# Patient Record
Sex: FEMALE | Race: WHITE | NOT HISPANIC OR LATINO | Employment: FULL TIME | URBAN - METROPOLITAN AREA
[De-identification: names, ages, dates, MRNs, and addresses within clinical notes are randomized per-mention and may not be internally consistent; named-entity substitution may affect disease eponyms.]

---

## 2022-07-28 ENCOUNTER — TELEPHONE (OUTPATIENT)
Dept: OBGYN CLINIC | Facility: CLINIC | Age: 62
End: 2022-07-28

## 2022-07-28 DIAGNOSIS — Z12.31 ENCOUNTER FOR SCREENING MAMMOGRAM FOR BREAST CANCER: Primary | ICD-10-CM

## 2022-07-28 NOTE — TELEPHONE ENCOUNTER
Tea Vivas has not been seen for a while and has a [facility] set up for 9/30 with Dr Rhianna Askew  She is behind on her mammogram and would like to get her script and mammogram done before her visit in September  She uses [facility]  Advised the pt I will fax the script over, and she will be able to access the order via [employer] as well  Give [facility] 24 hours to receive the fax prior to calling to set up an apt

## 2022-08-14 DIAGNOSIS — Z12.31 ENCOUNTER FOR SCREENING MAMMOGRAM FOR BREAST CANCER: ICD-10-CM

## 2022-09-30 ENCOUNTER — ANNUAL EXAM (OUTPATIENT)
Dept: OBGYN CLINIC | Facility: CLINIC | Age: 62
End: 2022-09-30

## 2022-09-30 VITALS
BODY MASS INDEX: 21.26 KG/M2 | SYSTOLIC BLOOD PRESSURE: 138 MMHG | HEIGHT: 63 IN | WEIGHT: 120 LBS | DIASTOLIC BLOOD PRESSURE: 64 MMHG

## 2022-09-30 DIAGNOSIS — Z12.31 ENCOUNTER FOR SCREENING MAMMOGRAM FOR MALIGNANT NEOPLASM OF BREAST: ICD-10-CM

## 2022-09-30 DIAGNOSIS — Z13.820 SCREENING FOR OSTEOPOROSIS: ICD-10-CM

## 2022-09-30 DIAGNOSIS — E28.39 ESTROGEN DEFICIENCY: ICD-10-CM

## 2022-09-30 DIAGNOSIS — Z01.419 ENCOUNTER FOR ANNUAL ROUTINE GYNECOLOGICAL EXAMINATION: Primary | ICD-10-CM

## 2022-09-30 DIAGNOSIS — Z12.4 CERVICAL CANCER SCREENING: ICD-10-CM

## 2022-09-30 RX ORDER — AMLODIPINE BESYLATE 5 MG/1
5 TABLET ORAL DAILY
COMMUNITY
Start: 2022-08-04

## 2022-09-30 RX ORDER — DEXTROMETHORPHAN POLISTIREX 30 MG/5 ML
SUSPENSION, EXTENDED RELEASE 12 HR ORAL
COMMUNITY

## 2022-09-30 NOTE — LETTER
2022     Ra Galindo [address]  [facility] [address]    Patient: Marcelo Washington   YOB: 1960   Date of Visit: 2022       Dear Dr Sekou Talbot: Thank you for referring Marcelo Washington to me for evaluation  Below are my notes for this consultation  If you have questions, please do not hesitate to call me  I look forward to following your patient along with you  Sincerely,        Leonora Jackson MD        CC: No Recipients  Leonora Jackson MD  2022  1:59 PM  Sign when Signing Visit  Annual Wellness Visit  [address]   [address] [facility], [address], [facility] [address]    ASSESSMENT/PLAN: Marcelo Washington is a 60 y o   who presents for annual gynecologic exam     Encounter for routine gynecologic examination  - Routine well woman exam completed today  - Cervical Cancer Screening: Current ASCCP Guidelines reviewed  Last Pap: 09/15/2017 normal  Next Pap Due: today, routine   - Contraceptive counseling discussed  Current contraception: vasectomy,   - Breast Cancer Screening: Last Mammogram 2022, normal  - Colorectal cancer screening last  per pt, next due   - The following were reviewed in today's visit: mammography screening ordered, osteoporosis, adequate intake of calcium and vitamin D, exercise, healthy diet and DEXA ordered    Additional problems addressed during this visit:  1  Encounter for annual routine gynecological examination    2  Cervical cancer screening  -     Thinprep Tis Pap and HPV mRNA E6/E7 Reflex HPV 16,18/45    3  Encounter for screening mammogram for malignant neoplasm of breast  -     Mammo screening bilateral w 3d & cad; Future    4  Screening for osteoporosis  Comments:  Encouraged to check dexa scan coverage with insurance  Orders:  -     DXA bone density spine hip and pelvis; Future    5  Estrogen deficiency  -     DXA bone density spine hip and pelvis;  Future        Next visit: 1 year Wellness      CC:  Annual Gynecologic Examination    HPI: Andrew Pina is a 60 y o   who presents for annual gynecologic examination  She denies any breast, urinary or pelvic issues at today's visit  Gyn History  No LMP recorded  Patient is postmenopausal      Last Pap: 09/15/2017 was normal    She  reports being sexually active and has had partner(s) who are male  She reports using the following method of birth control/protection: Post-menopausal        OB History      Past Medical History:  2022: History of screening mammography      Comment:  B-Rads 2  No date: Hypertension     Past Surgical History:  2016: BREAST BIOPSY; Left  2009: BREAST LUMPECTOMY  2012: [address]: SINUS SURGERY  1963: TONSILLECTOMY     Family History   Problem Relation Age of Onset    Coronary artery disease Mother     Hypertension Mother     Hyperlipidemia Mother     Diabetes Mother     Breast cancer Neg Hx     Colon cancer Neg Hx     Ovarian cancer Neg Hx         Social History     Tobacco Use    Smoking status: Never Smoker    Smokeless tobacco: Never Used   Vaping Use    Vaping Use: Never used   Substance Use Topics    Alcohol use: Yes     Comment: occasional    Drug use: Never          Current Outpatient Medications:     amLODIPine (NORVASC) 5 mg tablet, Take 5 mg by mouth daily, Disp: , Rfl:     Calcium-Vitamin D-Vitamin K (Calcium + D) 500-1000-40 MG-UNT-MCG CHEW, as directed, Disp: , Rfl:     Multiple Vitamins-Minerals (PRESERVISION AREDS 2+MULTI VIT PO), Every 12 hours, Disp: , Rfl:     She has No Known Allergies       ROS negative except as noted in HPI    Objective:  /64   Ht 5' 3" (1 6 m)   Wt 54 4 kg (120 lb)   Breastfeeding No   BMI 21 26 kg/m²      Physical Exam  Constitutional:       Appearance: Normal appearance  Chest:   Breasts:      Right: Normal  No mass, tenderness or axillary adenopathy  Left: Normal  No mass, tenderness or axillary adenopathy  Abdominal:      Palpations: Abdomen is soft  Tenderness: There is no abdominal tenderness  Genitourinary:     General: Normal vulva  Vagina: No bleeding or lesions  Cervix: Normal       Uterus: Normal  Not tender  Adnexa:         Right: No mass or tenderness  Left: No mass or tenderness  Rectum: No mass or external hemorrhoid  Normal anal tone  Comments: Atrophic changes appropriate to age  Musculoskeletal:         General: Normal range of motion  Lymphadenopathy:      Upper Body:      Right upper body: No axillary adenopathy  Left upper body: No axillary adenopathy  Neurological:      Mental Status: She is alert and oriented to person, place, and time     Psychiatric:         Mood and Affect: Mood normal          Behavior: Behavior normal

## 2022-09-30 NOTE — PROGRESS NOTES
Annual Wellness Visit  [address]   [address], [address], [facility]    ASSESSMENT/PLAN: Dot Prabhakar is a 60 y o   who presents for annual gynecologic exam     Encounter for routine gynecologic examination  - Routine well woman exam completed today  - Cervical Cancer Screening: Current ASCCP Guidelines reviewed  Last Pap: 09/15/2017 normal  Next Pap Due: today, routine   - Contraceptive counseling discussed  Current contraception: vasectomy,   - Breast Cancer Screening: Last Mammogram 2022, normal  - Colorectal cancer screening last  per pt, next due   - The following were reviewed in today's visit: mammography screening ordered, osteoporosis, adequate intake of calcium and vitamin D, exercise, healthy diet and DEXA ordered    Additional problems addressed during this visit:  1  Encounter for annual routine gynecological examination    2  Cervical cancer screening  -     Thinprep Tis Pap and HPV mRNA E6/E7 Reflex HPV 16,18/45    3  Encounter for screening mammogram for malignant neoplasm of breast  -     Mammo screening bilateral w 3d & cad; Future    4  Screening for osteoporosis  Comments:  Encouraged to check dexa scan coverage with insurance  Orders:  -     DXA bone density spine hip and pelvis; Future    5  Estrogen deficiency  -     DXA bone density spine hip and pelvis; Future        Next visit: 1 year Wellness      CC:  Annual Gynecologic Examination    HPI: Dot Prabhakar is a 60 y o   who presents for annual gynecologic examination  She denies any breast, urinary or pelvic issues at today's visit  Gyn History  No LMP recorded  Patient is postmenopausal      Last Pap: 09/15/2017 was normal    She  reports being sexually active and has had partner(s) who are male  She reports using the following method of birth control/protection: Post-menopausal        OB History      Past Medical History:  2022:  History of screening mammography      Comment:  B-Rads 2  No date: Hypertension     Past Surgical History:  2016: BREAST BIOPSY; Left  2009: BREAST LUMPECTOMY  2012: [address]: SINUS SURGERY  1963: TONSILLECTOMY     Family History   Problem Relation Age of Onset    Coronary artery disease Mother     Hypertension Mother     Hyperlipidemia Mother     Diabetes Mother     Breast cancer Neg Hx     Colon cancer Neg Hx     Ovarian cancer Neg Hx         Social History     Tobacco Use    Smoking status: Never Smoker    Smokeless tobacco: Never Used   Vaping Use    Vaping Use: Never used   Substance Use Topics    Alcohol use: Yes     Comment: occasional    Drug use: Never          Current Outpatient Medications:     amLODIPine (NORVASC) 5 mg tablet, Take 5 mg by mouth daily, Disp: , Rfl:     Calcium-Vitamin D-Vitamin K (Calcium + D) 500-1000-40 MG-UNT-MCG CHEW, as directed, Disp: , Rfl:     Multiple Vitamins-Minerals (PRESERVISION AREDS 2+MULTI VIT PO), Every 12 hours, Disp: , Rfl:     She has No Known Allergies       ROS negative except as noted in HPI    Objective:  /64   Ht 5' 3" (1 6 m)   Wt 54 4 kg (120 lb)   Breastfeeding No   BMI 21 26 kg/m²      Physical Exam  Constitutional:       Appearance: Normal appearance  Chest:   Breasts:      Right: Normal  No mass, tenderness or axillary adenopathy  Left: Normal  No mass, tenderness or axillary adenopathy  Abdominal:      Palpations: Abdomen is soft  Tenderness: There is no abdominal tenderness  Genitourinary:     General: Normal vulva  Vagina: No bleeding or lesions  Cervix: Normal       Uterus: Normal  Not tender  Adnexa:         Right: No mass or tenderness  Left: No mass or tenderness  Rectum: No mass or external hemorrhoid  Normal anal tone  Comments: Atrophic changes appropriate to age  Musculoskeletal:         General: Normal range of motion     Lymphadenopathy:      Upper Body:      Right upper body: No axillary adenopathy  Left upper body: No axillary adenopathy  Neurological:      Mental Status: She is alert and oriented to person, place, and time     Psychiatric:         Mood and Affect: Mood normal          Behavior: Behavior normal

## 2022-10-04 LAB
CLINICAL INFO: NORMAL
CYTOLOGY CMNT CVX/VAG CYTO-IMP: NORMAL
DATE PREVIOUS BX: NORMAL
HPV E6+E7 MRNA CVX QL NAA+PROBE: NOT DETECTED
LMP START DATE: NORMAL
SL AMB PREV. PAP:: NORMAL
SPECIMEN SOURCE CVX/VAG CYTO: NORMAL
STAT OF ADQ CVX/VAG CYTO-IMP: NORMAL

## 2023-09-25 ENCOUNTER — PREP FOR PROCEDURE (OUTPATIENT)
Age: 63
End: 2023-09-25

## 2023-09-25 ENCOUNTER — TELEPHONE (OUTPATIENT)
Age: 63
End: 2023-09-25

## 2023-09-25 DIAGNOSIS — Z12.11 SCREENING FOR COLON CANCER: Primary | ICD-10-CM

## 2023-09-25 NOTE — TELEPHONE ENCOUNTER
Scheduled date of colonoscopy (as of today): 11/13/2023   Physician performing colonoscopy: DR CORDON [facility]   Location of colonoscopy: [address] [facility]  Bowel prep reviewed with patient: MIRALAX/DULCOLAX  Instructions reviewed with patient by: [facility] via telephone. Procedure directions sent via [employer].   Clearances:  n/a

## 2023-09-25 NOTE — TELEPHONE ENCOUNTER
09/25/23  Screened by: Annette Montiel    Referring Provider Dr. Tremaine Todd    Pre- Screening: Colon cancer screening    There is no height or weight on file to calculate BMI.21.26  Has patient been referred for a routine screening Colonoscopy? yes  Is the patient between 43-73 years old? yes      Previous Colonoscopy yes   If yes:    Date: 10 years ago    Facility: [facility]    Reason:       SCHEDULING STAFF: If the patient is between 45yrs-49yrs, please advise patient to confirm benefits/coverage with their insurance company for a routine screening colonoscopy, some insurance carriers will only cover at [facility] [address], [facility] or older. If the patient is over 66years old, please schedule an office visit. Does the patient want to see a Gastroenterologist prior to their procedure OR are they having any GI symptoms? no    Has the patient been hospitalized or had abdominal surgery in the past 6 months? no    Does the patient use supplemental oxygen? no    Does the patient take Coumadin, Lovenox, Plavix, Elliquis, Xarelto, or other blood thinning medication? no    Has the patient had a stroke, cardiac event, or stent placed in the past year? no    SCHEDULING STAFF: If patient answers NO to above questions, then schedule procedure. If patient answers YES to above questions, then schedule office appointment. If patient is between 45yrs - 49yrs, please advise patient that we will have to confirm benefits & coverage with their insurance company for a routine screening colonoscopy.     PT PASSED OA    PT WILL CALL BACK TO SCHEDULE DUE TO NOT HAVING A

## 2023-10-30 ENCOUNTER — ANESTHESIA EVENT (OUTPATIENT)
Dept: ANESTHESIOLOGY | Facility: AMBULATORY SURGERY CENTER | Age: 63
End: 2023-10-30

## 2023-10-30 ENCOUNTER — ANESTHESIA (OUTPATIENT)
Dept: ANESTHESIOLOGY | Facility: AMBULATORY SURGERY CENTER | Age: 63
End: 2023-10-30

## 2023-11-02 ENCOUNTER — TELEPHONE (OUTPATIENT)
Dept: GASTROENTEROLOGY | Facility: CLINIC | Age: 63
End: 2023-11-02

## 2023-11-02 NOTE — TELEPHONE ENCOUNTER
Procedure confirmed  Colonoscopy     Via: [employer]    Instructions given: [employer]     Prep Given: Miralax/Dulcolax    Call the office if there are any questions.

## 2023-11-10 ENCOUNTER — ANNUAL EXAM (OUTPATIENT)
Dept: OBGYN CLINIC | Facility: CLINIC | Age: 63
End: 2023-11-10
Payer: COMMERCIAL

## 2023-11-10 VITALS
BODY MASS INDEX: 21.83 KG/M2 | DIASTOLIC BLOOD PRESSURE: 60 MMHG | HEIGHT: 63 IN | SYSTOLIC BLOOD PRESSURE: 138 MMHG | WEIGHT: 123.2 LBS

## 2023-11-10 DIAGNOSIS — Z12.31 BREAST CANCER SCREENING BY MAMMOGRAM: ICD-10-CM

## 2023-11-10 DIAGNOSIS — Z13.820 SCREENING FOR OSTEOPOROSIS: ICD-10-CM

## 2023-11-10 DIAGNOSIS — Z01.419 ENCOUNTER FOR ANNUAL ROUTINE GYNECOLOGICAL EXAMINATION: Primary | ICD-10-CM

## 2023-11-10 DIAGNOSIS — E28.39 ESTROGEN DEFICIENCY: ICD-10-CM

## 2023-11-10 DIAGNOSIS — Z98.890 HISTORY OF LEFT BREAST BIOPSY: ICD-10-CM

## 2023-11-10 PROCEDURE — 99396 PREV VISIT EST AGE 40-64: CPT | Performed by: OBSTETRICS & GYNECOLOGY

## 2023-11-10 NOTE — LETTER
LOV 12/13/18.  NOV 3/13/19.  Folic acid refilled per protocol   November 10, 7582     [facility] [address] [facility] [place] [facility]    Patient: Nikolas Bro   YOB: 1960   Date of Visit: 11/10/2023       Dear Dr. Suero Case: Thank you for referring Nikolas Bro to me for evaluation. Below are my notes for this consultation. If you have questions, please do not hesitate to call me. I look forward to following your patient along with you. Sincerely,        Murali Gregory MD        CC: No Recipients    Murali Gregory MD  11/10/2023  1:45 PM  Sign when Signing Visit  Annual Wellness Visit  [address]  [address] [facility], [address], [facility] [place], [address]     ASSESSMENT/PLAN: Nikolas Bro is a 58 y.o.  who presents for annual gynecologic exam.    Encounter for routine gynecologic examination  - Routine well woman exam completed today. - Cervical Cancer Screening: Current ASCCP Guidelines reviewed. Last Pap: 2022 normal. Next Pap Due: 2 years, routine.  - Contraceptive counseling discussed. Current contraception: postmenopausal  - Breast Cancer Screening: Last Mammogram 10/2023 per pt normal  - Colorectal cancer screening last , next due - scheduled Monday. - The following were reviewed in today's visit: mammography screening ordered, osteoporosis, adequate intake of calcium and vitamin D, exercise, and healthy diet    Additional problems addressed during this visit:  1. Encounter for annual routine gynecological examination    2. Breast cancer screening by mammogram  -     Mammo screening bilateral w 3d & cad; Future    3. History of left breast biopsy  -     Mammo screening bilateral w 3d & cad; Future    4. Screening for osteoporosis  Comments:  Encouraged to check insurance coverage prior to study  Orders:  -     DXA bone density spine hip and pelvis; Future    5. Estrogen deficiency  -     DXA bone density spine hip and pelvis;  Future        Next visit: 1 year Wellness      CC:  Annual Gynecologic Examination    HPI: Fatemeh Rodriguez is a 58 y.o.  who presents for annual gynecologic examination. She denies any breast, urinary or pelvic issues at today's visit. Still sexually active. No issues. Gyn History  No LMP recorded. Patient is postmenopausal.     Last Pap: 2022 was normal    She  reports being sexually active and has had partner(s) who are male. She reports using the following method of birth control/protection: Post-menopausal.       OB History      Past Medical History:  2022: History of screening mammography      Comment:  B-Rads 2. No date: Hypertension  No date: Varicella     Past Surgical History:  2016: BREAST BIOPSY; Left  2009: BREAST LUMPECTOMY  2012: [address]: SINUS SURGERY  1963: TONSILLECTOMY     Family History   Problem Relation Age of Onset   • Coronary artery disease Mother    • Hypertension Mother    • Hyperlipidemia Mother    • Diabetes Mother    • Lung cancer Father    • Acute myelogenous leukemia Sister    • No Known Problems Sister    • Drug abuse Brother    • No Known Problems Brother    • No Known Problems Daughter    • No Known Problems Son    • No Known Problems Paternal Grandfather    • Breast cancer Neg Hx    • Colon cancer Neg Hx    • Ovarian cancer Neg Hx         Social History     Tobacco Use   • Smoking status: Never   • Smokeless tobacco: Never   Vaping Use   • Vaping Use: Never used   Substance Use Topics   • Alcohol use: Yes     Alcohol/week: 2.0 standard drinks of alcohol     Types: 2 Glasses of wine per week     Comment: occasional   • Drug use: Never          Current Outpatient Medications:   •  amLODIPine (NORVASC) 5 mg tablet, Take 5 mg by mouth daily, Disp: , Rfl:   •  Calcium-Vitamin D-Vitamin K (Calcium + D) 500-1000-40 MG-UNT-MCG CHEW, as directed, Disp: , Rfl:   •  Multiple Vitamins-Minerals (PRESERVISION AREDS 2+MULTI VIT PO), Every 12 hours, Disp: , Rfl:     She has No Known Allergies. .    ROS negative except as noted in HPI    Objective:  /60 (BP Location: Left arm, Patient Position: Sitting, Cuff Size: Standard)   Ht 5' 3" (1.6 m)   Wt 55.9 kg (123 lb 3.2 oz)   BMI 21.82 kg/m²      Physical Exam  Constitutional:       Appearance: Normal appearance. Chest:   Breasts:     Right: Normal. No mass or tenderness. Left: Normal. No mass or tenderness. Abdominal:      Palpations: Abdomen is soft. Tenderness: There is no abdominal tenderness. Genitourinary:     General: Normal vulva. Vagina: No bleeding or lesions. Cervix: Normal.      Uterus: Normal. Not tender. Adnexa:         Right: No mass or tenderness. Left: No mass or tenderness. Rectum: No mass or external hemorrhoid. Normal anal tone. Comments: Atrophic changes appropriate to age  Musculoskeletal:         General: Normal range of motion. Lymphadenopathy:      Upper Body:      Right upper body: No axillary adenopathy. Left upper body: No axillary adenopathy. Neurological:      Mental Status: She is alert and oriented to person, place, and time.    Psychiatric:         Mood and Affect: Mood normal.         Behavior: Behavior normal.

## 2023-11-10 NOTE — PROGRESS NOTES
Annual Wellness Visit  [address]  [address] [facility], [address], [place], [address] [facility]    ASSESSMENT/PLAN: Deloris Sauceda is a 58 y.o.  who presents for annual gynecologic exam.    Encounter for routine gynecologic examination  - Routine well woman exam completed today. - Cervical Cancer Screening: Current ASCCP Guidelines reviewed. Last Pap: 2022 normal. Next Pap Due: 2 years, routine.  - Contraceptive counseling discussed. Current contraception: postmenopausal  - Breast Cancer Screening: Last Mammogram 10/2023 per pt normal  - Colorectal cancer screening last , next due - scheduled Monday. - The following were reviewed in today's visit: mammography screening ordered, osteoporosis, adequate intake of calcium and vitamin D, exercise, and healthy diet    Additional problems addressed during this visit:  1. Encounter for annual routine gynecological examination    2. Breast cancer screening by mammogram  -     Mammo screening bilateral w 3d & cad; Future    3. History of left breast biopsy  -     Mammo screening bilateral w 3d & cad; Future    4. Screening for osteoporosis  Comments:  Encouraged to check insurance coverage prior to study  Orders:  -     DXA bone density spine hip and pelvis; Future    5. Estrogen deficiency  -     DXA bone density spine hip and pelvis; Future        Next visit: 1 year Wellness      CC:  Annual Gynecologic Examination    HPI: Deloris Sauceda is a 58 y.o.  who presents for annual gynecologic examination. She denies any breast, urinary or pelvic issues at today's visit. Still sexually active. No issues. Gyn History  No LMP recorded. Patient is postmenopausal.     Last Pap: 2022 was normal    She  reports being sexually active and has had partner(s) who are male. She reports using the following method of birth control/protection: Post-menopausal.       OB History      Past Medical History:  2022:  History of screening mammography      Comment:  B-Rads 2. No date: Hypertension  No date: Varicella     Past Surgical History:  2016: BREAST BIOPSY; Left  2009: BREAST LUMPECTOMY  2012: [address]: SINUS SURGERY  1963: TONSILLECTOMY     Family History   Problem Relation Age of Onset    Coronary artery disease Mother     Hypertension Mother     Hyperlipidemia Mother     Diabetes Mother     Lung cancer Father     Acute myelogenous leukemia Sister     No Known Problems Sister     Drug abuse Brother     No Known Problems Brother     No Known Problems Daughter     No Known Problems Son     No Known Problems Paternal Grandfather     Breast cancer Neg Hx     Colon cancer Neg Hx     Ovarian cancer Neg Hx         Social History     Tobacco Use    Smoking status: Never    Smokeless tobacco: Never   Vaping Use    Vaping Use: Never used   Substance Use Topics    Alcohol use: Yes     Alcohol/week: 2.0 standard drinks of alcohol     Types: 2 Glasses of wine per week     Comment: occasional    Drug use: Never          Current Outpatient Medications:     amLODIPine (NORVASC) 5 mg tablet, Take 5 mg by mouth daily, Disp: , Rfl:     Calcium-Vitamin D-Vitamin K (Calcium + D) 500-1000-40 MG-UNT-MCG CHEW, as directed, Disp: , Rfl:     Multiple Vitamins-Minerals (PRESERVISION AREDS 2+MULTI VIT PO), Every 12 hours, Disp: , Rfl:     She has No Known Allergies. .    ROS negative except as noted in HPI    Objective:  /60 (BP Location: Left arm, Patient Position: Sitting, Cuff Size: Standard)   Ht 5' 3" (1.6 m)   Wt 55.9 kg (123 lb 3.2 oz)   BMI 21.82 kg/m²      Physical Exam  Constitutional:       Appearance: Normal appearance. Chest:   Breasts:     Right: Normal. No mass or tenderness. Left: Normal. No mass or tenderness. Abdominal:      Palpations: Abdomen is soft. Tenderness: There is no abdominal tenderness. Genitourinary:     General: Normal vulva. Vagina: No bleeding or lesions.       Cervix: Normal.      Uterus: Normal. Not tender. Adnexa:         Right: No mass or tenderness. Left: No mass or tenderness. Rectum: No mass or external hemorrhoid. Normal anal tone. Comments: Atrophic changes appropriate to age  Musculoskeletal:         General: Normal range of motion. Lymphadenopathy:      Upper Body:      Right upper body: No axillary adenopathy. Left upper body: No axillary adenopathy. Neurological:      Mental Status: She is alert and oriented to person, place, and time.    Psychiatric:         Mood and Affect: Mood normal.         Behavior: Behavior normal.

## 2023-11-13 ENCOUNTER — HOSPITAL ENCOUNTER (OUTPATIENT)
Dept: GASTROENTEROLOGY | Facility: AMBULATORY SURGERY CENTER | Age: 63
Discharge: HOME | End: 2023-11-13
Attending: INTERNAL MEDICINE
Payer: COMMERCIAL

## 2023-11-13 ENCOUNTER — ANESTHESIA (OUTPATIENT)
Dept: GASTROENTEROLOGY | Facility: AMBULATORY SURGERY CENTER | Age: 63
End: 2023-11-13

## 2023-11-13 ENCOUNTER — ANESTHESIA EVENT (OUTPATIENT)
Dept: GASTROENTEROLOGY | Facility: AMBULATORY SURGERY CENTER | Age: 63
End: 2023-11-13

## 2023-11-13 VITALS
HEART RATE: 68 BPM | DIASTOLIC BLOOD PRESSURE: 71 MMHG | BODY MASS INDEX: 22.15 KG/M2 | HEIGHT: 63 IN | OXYGEN SATURATION: 100 % | TEMPERATURE: 97.4 F | SYSTOLIC BLOOD PRESSURE: 158 MMHG | WEIGHT: 125 LBS | RESPIRATION RATE: 19 BRPM

## 2023-11-13 DIAGNOSIS — Z12.11 SCREENING FOR COLON CANCER: ICD-10-CM

## 2023-11-13 PROBLEM — I10 HYPERTENSION: Status: ACTIVE | Noted: 2023-11-13

## 2023-11-13 PROBLEM — Z98.890 PONV (POSTOPERATIVE NAUSEA AND VOMITING): Status: ACTIVE | Noted: 2023-11-13

## 2023-11-13 PROBLEM — R11.2 PONV (POSTOPERATIVE NAUSEA AND VOMITING): Status: ACTIVE | Noted: 2023-11-13

## 2023-11-13 PROCEDURE — G0121 COLON CA SCRN NOT HI RSK IND: HCPCS | Performed by: INTERNAL MEDICINE

## 2023-11-13 RX ORDER — SODIUM CHLORIDE, SODIUM LACTATE, POTASSIUM CHLORIDE, CALCIUM CHLORIDE 600; 310; 30; 20 MG/100ML; MG/100ML; MG/100ML; MG/100ML
INJECTION, SOLUTION INTRAVENOUS CONTINUOUS PRN
Status: DISCONTINUED | OUTPATIENT
Start: 2023-11-13 | End: 2023-11-13

## 2023-11-13 RX ORDER — LIDOCAINE HYDROCHLORIDE 10 MG/ML
INJECTION, SOLUTION EPIDURAL; INFILTRATION; INTRACAUDAL; PERINEURAL AS NEEDED
Status: DISCONTINUED | OUTPATIENT
Start: 2023-11-13 | End: 2023-11-13

## 2023-11-13 RX ORDER — SODIUM CHLORIDE, SODIUM LACTATE, POTASSIUM CHLORIDE, CALCIUM CHLORIDE 600; 310; 30; 20 MG/100ML; MG/100ML; MG/100ML; MG/100ML
50 INJECTION, SOLUTION INTRAVENOUS CONTINUOUS
Status: DISCONTINUED | OUTPATIENT
Start: 2023-11-13 | End: 2023-11-17 | Stop reason: HOSPADM

## 2023-11-13 RX ORDER — PROPOFOL 10 MG/ML
INJECTION, EMULSION INTRAVENOUS AS NEEDED
Status: DISCONTINUED | OUTPATIENT
Start: 2023-11-13 | End: 2023-11-13

## 2023-11-13 RX ADMIN — PROPOFOL 30 MG: 10 INJECTION, EMULSION INTRAVENOUS at 13:19

## 2023-11-13 RX ADMIN — PROPOFOL 20 MG: 10 INJECTION, EMULSION INTRAVENOUS at 13:13

## 2023-11-13 RX ADMIN — PROPOFOL 20 MG: 10 INJECTION, EMULSION INTRAVENOUS at 13:17

## 2023-11-13 RX ADMIN — PROPOFOL 40 MG: 10 INJECTION, EMULSION INTRAVENOUS at 13:20

## 2023-11-13 RX ADMIN — LIDOCAINE HYDROCHLORIDE 50 MG: 10 INJECTION, SOLUTION EPIDURAL; INFILTRATION; INTRACAUDAL; PERINEURAL at 13:04

## 2023-11-13 RX ADMIN — PROPOFOL 20 MG: 10 INJECTION, EMULSION INTRAVENOUS at 13:11

## 2023-11-13 RX ADMIN — SODIUM CHLORIDE, SODIUM LACTATE, POTASSIUM CHLORIDE, CALCIUM CHLORIDE 50 ML/HR: 600; 310; 30; 20 INJECTION, SOLUTION INTRAVENOUS at 12:49

## 2023-11-13 RX ADMIN — PROPOFOL 30 MG: 10 INJECTION, EMULSION INTRAVENOUS at 13:06

## 2023-11-13 RX ADMIN — PROPOFOL 30 MG: 10 INJECTION, EMULSION INTRAVENOUS at 13:08

## 2023-11-13 RX ADMIN — PROPOFOL 30 MG: 10 INJECTION, EMULSION INTRAVENOUS at 13:26

## 2023-11-13 RX ADMIN — PROPOFOL 80 MG: 10 INJECTION, EMULSION INTRAVENOUS at 13:04

## 2023-11-13 NOTE — ANESTHESIA PREPROCEDURE EVALUATION
Procedure:  COLONOSCOPY    Relevant Problems   ANESTHESIA   (+) PONV (postoperative nausea and vomiting) (With GA; ok with prior colonoscopy)      CARDIO   (+) Hypertension      PULMONARY (within normal limits)   (-) Sleep apnea   (-) Smoking   (-) URI (upper respiratory infection)      Physical Exam    Airway    Mallampati score: I  TM Distance: >3 FB  Neck ROM: full     Dental   No notable dental hx     Cardiovascular      Pulmonary      Other Findings        Anesthesia Plan  ASA Score- 2     Anesthesia Type- IV sedation with anesthesia with ASA Monitors. Additional Monitors:     Airway Plan:            Plan Factors-Exercise tolerance (METS): >4 METS. Chart reviewed. Existing labs reviewed. Patient summary reviewed. Patient is not a current smoker. Induction- intravenous. Postoperative Plan-     Informed Consent- Anesthetic plan and risks discussed with patient. I personally reviewed this patient with the CRNA. Discussed and agreed on the Anesthesia Plan with the CRNA. Arpita Irwin

## 2023-11-13 NOTE — ANESTHESIA POSTPROCEDURE EVALUATION
Post-Op Assessment Note    CV Status:  Stable  Pain Score: 0    Pain management: adequate     Mental Status:  Alert and awake   Hydration Status:  Stable   PONV Controlled:  None   Airway Patency:  Patent      Post Op Vitals Reviewed: Yes      Staff: CRNA         No notable events documented.     BP  143/62   Temp     Pulse  71   Resp 16   SpO2 100

## 2023-11-13 NOTE — H&P
History and Physical - [facility]    Alejos Prader 58 y.o. female MRN: [id number]      HPI: Alejos Prader is a 58 y.o. female who presents for colon cancer screening. No Known Allergies      REVIEW OF SYSTEMS: Per the HPI, and otherwise unremarkable. Historical Information     Past Medical History:   Diagnosis Date    History of screening mammography 08/08/2022    B-Rads 2.     Hypertension     PONV (postoperative nausea and vomiting)     Varicella      Past Surgical History:   Procedure Laterality Date    BREAST BIOPSY Left 2016    BREAST LUMPECTOMY  2009    COLONOSCOPY      SHOULDER SURGERY  2012    [address]     Social History   Social History     Substance and Sexual Activity   Alcohol Use Not Currently     Social History     Substance and Sexual Activity   Drug Use Never     Social History     Tobacco Use   Smoking Status Never   Smokeless Tobacco Never     Family History   Problem Relation Age of Onset    Coronary artery disease Mother     Hypertension Mother     Hyperlipidemia Mother     Diabetes Mother     Lung cancer Father     Acute myelogenous leukemia Sister     No Known Problems Sister     Drug abuse Brother     No Known Problems Brother     No Known Problems Daughter     No Known Problems Son     No Known Problems Paternal Grandfather     Breast cancer Neg Hx     Colon cancer Neg Hx     Ovarian cancer Neg Hx        Meds/Allergies       Current Outpatient Medications:     amLODIPine (NORVASC) 5 mg tablet    Calcium-Vitamin D-Vitamin K (Calcium + D) 500-1000-40 MG-UNT-MCG CHEW    Multiple Vitamins-Minerals (PRESERVISION AREDS 2+MULTI VIT PO)    Current Facility-Administered Medications:     lactated ringers infusion, 50 mL/hr, Intravenous, Continuous, Continue from Pre-op at 11/13/23 1252        Objective     BP (!) 185/79   Pulse 70   Temp (!) 97.4 °F (36.3 °C) (Temporal)   Resp 18   Ht 5' 3" (1.6 m)   Wt 56.7 kg (125 lb) SpO2 99%   BMI 22.14 kg/m²       PHYSICAL EXAM    Gen: NAD AAOx3  Head: Normocephalic, Atraumatic  CV: S1S2 RRR no m/r/g  CHEST: Clear b/l no c/r/w  ABD: soft, +BS NT/ND no masses  EXT: no edema      ASSESSMENT/PLAN:  This is a 58y.o. year old female here for colonoscopy, and she is stable and optimized for her procedure.

## 2024-01-04 DIAGNOSIS — E28.39 ESTROGEN DEFICIENCY: ICD-10-CM

## 2024-01-04 DIAGNOSIS — Z13.820 SCREENING FOR OSTEOPOROSIS: ICD-10-CM

## 2024-01-05 PROBLEM — M85.89 OSTEOPENIA OF MULTIPLE SITES: Status: ACTIVE | Noted: 2024-01-05

## 2024-11-27 ENCOUNTER — ANNUAL EXAM (OUTPATIENT)
Dept: OBGYN CLINIC | Facility: CLINIC | Age: 64
End: 2024-11-27
Payer: COMMERCIAL

## 2024-11-27 VITALS
SYSTOLIC BLOOD PRESSURE: 122 MMHG | BODY MASS INDEX: 23.04 KG/M2 | WEIGHT: 130 LBS | HEIGHT: 63 IN | DIASTOLIC BLOOD PRESSURE: 78 MMHG

## 2024-11-27 DIAGNOSIS — M85.89 OSTEOPENIA OF MULTIPLE SITES: ICD-10-CM

## 2024-11-27 DIAGNOSIS — Z01.419 ENCOUNTER FOR ANNUAL ROUTINE GYNECOLOGICAL EXAMINATION: Primary | ICD-10-CM

## 2024-11-27 DIAGNOSIS — Z12.31 ENCOUNTER FOR SCREENING MAMMOGRAM FOR MALIGNANT NEOPLASM OF BREAST: ICD-10-CM

## 2024-11-27 PROCEDURE — 99397 PER PM REEVAL EST PAT 65+ YR: CPT | Performed by: OBSTETRICS & GYNECOLOGY

## 2024-11-27 NOTE — PROGRESS NOTES
Annual Wellness Visit  [facility] - [place] [address], [place] [address]    ASSESSMENT/PLAN: Rossana Rodriguez is a 64 y.o.  who presents for annual gynecologic exam.    Assessment & Plan  Encounter for annual routine gynecological examination  - Routine well woman exam completed today.  - Cervical Cancer Screening: Current ASCCP Guidelines reviewed. Last Pap: 2022 normal. Past abnormal pap: none.  Next Pap Due: 1-2 years.  - Contraceptive counseling discussed.  Current contraception: postmenopausal  - Breast Cancer Screening: Last Mammogram 2024 per pt normal  - Colorectal cancer screening last 2023 normal, next due .  - Osteoporosis screening 2023 osteopenia.  - The following were reviewed in today's visit: mammography screening ordered, adequate intake of calcium and vitamin D, exercise, and healthy diet       Encounter for screening mammogram for malignant neoplasm of breast    Orders:    Mammo screening bilateral w 3d and cad; Future    Osteopenia of multiple sites  Osteopenia on 2023 dexa.  Ca, Vit D, exericse.  Repeat dexa .      Next visit: 1 year Wellness      CC:  Annual Gynecologic Examination    HPI: Rossana Rodriguez is a 64 y.o.  who presents for annual gynecologic examination.  She denies any breast, urinary or pelvic issues at today's visit.    Sexually active.  No issues.  No new partners.        Gyn History  No LMP recorded. Patient is postmenopausal.     Last Pap: 2022 was normal    She  reports being sexually active and has had partner(s) who are male. She reports using the following method of birth control/protection: Post-menopausal.       OB History      Past Medical History:  2022: History of screening mammography      Comment:  B-Rads 2.  No date: Hypertension  No date: PONV (postoperative nausea and vomiting)  No date: Varicella     Past Surgical History:  2016: BREAST BIOPSY; Left  2009: BREAST LUMPECTOMY  No date:  "COLONOSCOPY  2012: SHOULDER SURGERY  1980: SINUS SURGERY  1963: TONSILLECTOMY     Family History   Problem Relation Age of Onset    Coronary artery disease Mother     Hypertension Mother     Hyperlipidemia Mother     Diabetes Mother     Lung cancer Father     Acute myelogenous leukemia Sister     No Known Problems Sister     Drug abuse Brother     No Known Problems Brother     No Known Problems Daughter     No Known Problems Son     No Known Problems Paternal Grandfather     Breast cancer Neg Hx     Colon cancer Neg Hx     Ovarian cancer Neg Hx         Social History     Tobacco Use    Smoking status: Never    Smokeless tobacco: Never   Vaping Use    Vaping status: Never Used   Substance Use Topics    Alcohol use: Yes     Alcohol/week: 2.0 standard drinks of alcohol     Types: 2 Glasses of wine per week    Drug use: Never          Current Outpatient Medications:     amLODIPine (NORVASC) 5 mg tablet, Take 5 mg by mouth daily, Disp: , Rfl:     Calcium-Vitamin D-Vitamin K (Calcium + D) 500-1000-40 MG-UNT-MCG CHEW, as directed, Disp: , Rfl:     Multiple Vitamins-Minerals (PRESERVISION AREDS 2+MULTI VIT PO), Every 12 hours, Disp: , Rfl:     She has no known allergies..    ROS negative except as noted in HPI    Objective:  /78 (BP Location: Left arm, Patient Position: Sitting, Cuff Size: Standard)   Ht 5' 3\" (1.6 m)   Wt 59 kg (130 lb)   BMI 23.03 kg/m²      Physical Exam  Constitutional:       Appearance: Normal appearance.   Chest:   Breasts:     Right: Normal. No mass or tenderness.      Left: Normal. No mass or tenderness.   Abdominal:      Palpations: Abdomen is soft.      Tenderness: There is no abdominal tenderness.   Genitourinary:     General: Normal vulva.      Vagina: No bleeding or lesions.      Cervix: Normal.      Uterus: Normal. Not tender.       Adnexa:         Right: No mass or tenderness.          Left: No mass or tenderness.        Rectum: No mass or external hemorrhoid. Normal anal tone.      " Comments: Atrophic changes appropriate to age  Musculoskeletal:         General: Normal range of motion.   Lymphadenopathy:      Upper Body:      Right upper body: No axillary adenopathy.      Left upper body: No axillary adenopathy.   Neurological:      Mental Status: She is alert and oriented to person, place, and time.   Psychiatric:         Mood and Affect: Mood normal.         Behavior: Behavior normal.